# Patient Record
Sex: MALE | Race: WHITE | NOT HISPANIC OR LATINO | Employment: OTHER | URBAN - METROPOLITAN AREA
[De-identification: names, ages, dates, MRNs, and addresses within clinical notes are randomized per-mention and may not be internally consistent; named-entity substitution may affect disease eponyms.]

---

## 2017-03-01 ENCOUNTER — ALLSCRIPTS OFFICE VISIT (OUTPATIENT)
Dept: OTHER | Facility: OTHER | Age: 82
End: 2017-03-01

## 2017-03-01 DIAGNOSIS — R05.9 COUGH: ICD-10-CM

## 2017-03-01 DIAGNOSIS — R93.89 ABNORMAL FINDINGS ON DIAGNOSTIC IMAGING OF OTHER SPECIFIED BODY STRUCTURES: ICD-10-CM

## 2017-09-08 ENCOUNTER — APPOINTMENT (OUTPATIENT)
Dept: LAB | Facility: CLINIC | Age: 82
End: 2017-09-08
Payer: COMMERCIAL

## 2017-09-08 ENCOUNTER — TRANSCRIBE ORDERS (OUTPATIENT)
Dept: LAB | Facility: CLINIC | Age: 82
End: 2017-09-08

## 2017-09-08 DIAGNOSIS — R05.9 COUGH: ICD-10-CM

## 2017-09-08 DIAGNOSIS — R93.89 ABNORMAL FINDINGS ON DIAGNOSTIC IMAGING OF OTHER SPECIFIED BODY STRUCTURES: ICD-10-CM

## 2017-09-08 PROCEDURE — 86698 HISTOPLASMA ANTIBODY: CPT

## 2017-09-08 PROCEDURE — 87385 HISTOPLASMA CAPSUL AG IA: CPT

## 2017-09-08 PROCEDURE — 36415 COLL VENOUS BLD VENIPUNCTURE: CPT

## 2017-09-08 PROCEDURE — 86612 BLASTOMYCES ANTIBODY: CPT

## 2017-09-11 ENCOUNTER — ALLSCRIPTS OFFICE VISIT (OUTPATIENT)
Dept: OTHER | Facility: OTHER | Age: 82
End: 2017-09-11

## 2017-09-11 ENCOUNTER — TRANSCRIBE ORDERS (OUTPATIENT)
Dept: ADMINISTRATIVE | Facility: HOSPITAL | Age: 82
End: 2017-09-11

## 2017-09-11 DIAGNOSIS — R91.1 COIN LESION: Primary | ICD-10-CM

## 2017-09-11 LAB
B DERMAT AB SER QL ID: NEGATIVE
H CAPSUL AB TITR SER ID: NEGATIVE {TITER}
H CAPSUL AG UR IA-ACNC: 0 NG/ML (ref 0–0.49)

## 2017-10-11 ENCOUNTER — HOSPITAL ENCOUNTER (OUTPATIENT)
Dept: PULMONOLOGY | Facility: HOSPITAL | Age: 82
Discharge: HOME | End: 2017-10-11
Attending: INTERNAL MEDICINE
Payer: COMMERCIAL

## 2017-10-11 ENCOUNTER — HOSPITAL ENCOUNTER (OUTPATIENT)
Dept: CT IMAGING | Facility: HOSPITAL | Age: 82
Discharge: HOME | End: 2017-10-11
Attending: INTERNAL MEDICINE
Payer: COMMERCIAL

## 2017-10-11 ENCOUNTER — GENERIC CONVERSION - ENCOUNTER (OUTPATIENT)
Dept: OTHER | Facility: OTHER | Age: 82
End: 2017-10-11

## 2017-10-11 DIAGNOSIS — R91.8 OTHER NONSPECIFIC ABNORMAL FINDING OF LUNG FIELD: ICD-10-CM

## 2017-10-11 DIAGNOSIS — F17.290 NICOTINE DEPENDENCE, OTHER TOBACCO PRODUCT, UNCOMPLICATED: ICD-10-CM

## 2017-10-11 DIAGNOSIS — R91.1 COIN LESION: ICD-10-CM

## 2017-10-11 DIAGNOSIS — R05.9 COUGH: ICD-10-CM

## 2017-10-11 PROCEDURE — 94726 PLETHYSMOGRAPHY LUNG VOLUMES: CPT

## 2017-10-11 PROCEDURE — 94760 N-INVAS EAR/PLS OXIMETRY 1: CPT

## 2017-10-11 PROCEDURE — 94729 DIFFUSING CAPACITY: CPT

## 2017-10-11 PROCEDURE — 71250 CT THORAX DX C-: CPT

## 2017-10-11 PROCEDURE — 94060 EVALUATION OF WHEEZING: CPT

## 2017-10-11 RX ORDER — ALBUTEROL SULFATE 2.5 MG/3ML
2.5 SOLUTION RESPIRATORY (INHALATION) ONCE
Status: COMPLETED | OUTPATIENT
Start: 2017-10-11 | End: 2017-10-11

## 2017-10-11 RX ADMIN — ALBUTEROL SULFATE 2.5 MG: 2.5 SOLUTION RESPIRATORY (INHALATION) at 13:50

## 2018-01-13 VITALS
HEART RATE: 60 BPM | TEMPERATURE: 98 F | SYSTOLIC BLOOD PRESSURE: 106 MMHG | WEIGHT: 182 LBS | RESPIRATION RATE: 18 BRPM | DIASTOLIC BLOOD PRESSURE: 58 MMHG | OXYGEN SATURATION: 97 % | HEIGHT: 68 IN | BODY MASS INDEX: 27.58 KG/M2

## 2018-01-14 VITALS
SYSTOLIC BLOOD PRESSURE: 118 MMHG | HEART RATE: 68 BPM | HEIGHT: 68 IN | WEIGHT: 182 LBS | DIASTOLIC BLOOD PRESSURE: 60 MMHG | RESPIRATION RATE: 16 BRPM | TEMPERATURE: 97.7 F | OXYGEN SATURATION: 98 % | BODY MASS INDEX: 27.58 KG/M2

## 2018-01-15 NOTE — RESULT NOTES
Verified Results  * CT CHEST WO CONTRAST [id number] 12:36PM Buddy Koehler Layer     Test Name Result Flag Reference   CT CHEST WO CONTRAST (Report)     CT CHEST WITHOUT IV CONTRAST     INDICATION: Follow-up pulmonary nodule  Cough  COMPARISON: May 31, 2012     TECHNIQUE: CT examination of the chest was performed without intravenous contrast  Axial, sagittal and coronal reformatted images were submitted for interpretation  Coronal thick section MIP (maximal intensity projection) images were also created  This examination, like all CT scans performed in the [facility], was performed utilizing techniques to minimize radiation dose exposure, including the use of iterative reconstruction and automated exposure control  FINDINGS:     LUNGS: No suspicious mass lesion or infiltrates evident  Single small RIGHT upper lobe nodule measuring 3 mm, stable compared to prior, image 21  The nodules previously indicated, on the RIGHT is pleural-based and likely related to a stable plaque  On the LEFT, not well appreciated on the current exam but also felt likely related to pleural plaque  In both lung bases there is dependent central peribronchial thickening and very mild bronchiectasis and mild interstitial thickening in the dependent lower lungs  While this could represent a component of underlying fibrosis, may also be related to    chronic infections  PLEURA: Multiple bilateral calcified pleural plaques distributed along the lateral pleura as well as the posterior medial regions, fairly symmetric, presumed related to asbestos exposure  Correlate with history  Prior RIGHT effusion resolved  HEART/GREAT VESSELS: Unremarkable for patient's age  MEDIASTINUM AND KAM: There are multiple partially calcified mediastinal and LEFT hilar lymph nodes  This is seen in conjunction with calcified granulomata within the spleen   This is most commonly associated with a prior granulomatous infection such as histoplasmosis  CHEST WALL AND LOWER NECK: Unremarkable  VISUALIZED STRUCTURES IN THE UPPER ABDOMEN: Unremarkable  OSSEOUS STRUCTURES: No acute fracture  No destructive osseous lesion  IMPRESSION:     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

## 2018-04-10 RX ORDER — ASPIRIN 81 MG
1 TABLET, DELAYED RELEASE (ENTERIC COATED) ORAL DAILY
COMMUNITY
Start: 2016-10-06

## 2018-04-10 RX ORDER — FLUTICASONE PROPIONATE 50 MCG
1 SPRAY, SUSPENSION (ML) NASAL 2 TIMES DAILY
COMMUNITY
Start: 2016-10-06 | End: 2020-08-12 | Stop reason: ALTCHOICE

## 2018-04-10 RX ORDER — HYDROXYZINE HYDROCHLORIDE 25 MG/1
TABLET, FILM COATED ORAL
Refills: 1 | COMMUNITY
Start: 2018-02-05 | End: 2020-08-12 | Stop reason: ALTCHOICE

## 2018-04-10 RX ORDER — FLUTICASONE PROPIONATE 50 MCG
1 SPRAY, SUSPENSION (ML) NASAL 2 TIMES DAILY
COMMUNITY
Start: 2017-03-01 | End: 2018-04-10 | Stop reason: SDUPTHER

## 2018-04-10 RX ORDER — TRIAMCINOLONE ACETONIDE 1 MG/G
CREAM TOPICAL
Refills: 1 | COMMUNITY
Start: 2018-03-29

## 2018-04-10 RX ORDER — FLUTICASONE FUROATE 100 UG/1
POWDER RESPIRATORY (INHALATION)
Refills: 5 | COMMUNITY
Start: 2018-01-25 | End: 2018-04-10 | Stop reason: SDUPTHER

## 2018-04-10 RX ORDER — CLOBETASOL PROPIONATE 0.5 MG/G
CREAM TOPICAL 2 TIMES DAILY
COMMUNITY
Start: 2016-10-06

## 2018-04-10 RX ORDER — FOLIC ACID 1 MG/1
1 TABLET ORAL DAILY
COMMUNITY
Start: 2016-10-06

## 2018-04-10 RX ORDER — BIOTIN 1 MG
1 TABLET ORAL DAILY
COMMUNITY
Start: 2016-10-06

## 2018-04-10 RX ORDER — MYCOPHENOLATE MOFETIL 500 MG/1
TABLET ORAL
Refills: 1 | COMMUNITY
Start: 2018-02-02

## 2018-04-11 ENCOUNTER — OFFICE VISIT (OUTPATIENT)
Dept: PULMONOLOGY | Facility: CLINIC | Age: 83
End: 2018-04-11
Payer: COMMERCIAL

## 2018-04-11 VITALS
DIASTOLIC BLOOD PRESSURE: 62 MMHG | SYSTOLIC BLOOD PRESSURE: 118 MMHG | TEMPERATURE: 98.5 F | RESPIRATION RATE: 17 BRPM | HEART RATE: 70 BPM | BODY MASS INDEX: 28.02 KG/M2 | OXYGEN SATURATION: 97 % | WEIGHT: 184.9 LBS | HEIGHT: 68 IN

## 2018-04-11 DIAGNOSIS — R05.3 CHRONIC COUGH: ICD-10-CM

## 2018-04-11 DIAGNOSIS — R91.8 LUNG NODULES: ICD-10-CM

## 2018-04-11 DIAGNOSIS — R93.89 ABNORMAL CHEST CT: ICD-10-CM

## 2018-04-11 DIAGNOSIS — J92.0 ASBESTOS-INDUCED PLEURAL PLAQUE: Primary | ICD-10-CM

## 2018-04-11 PROCEDURE — 99213 OFFICE O/P EST LOW 20 MIN: CPT | Performed by: INTERNAL MEDICINE

## 2018-04-11 NOTE — ASSESSMENT & PLAN NOTE
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, doubt postnasal drips since Flonase did not help at all, will consider possible cough variant asthma and so I gave patient free samples of Asmanex 200 mcg to use 1 puff b i d  and he will report to me in 4-6 weeks if he feels better I will order an inhaled corticosteroids  Otherwise if he continues to have cough then I may consider PPI or H2 blocker and also possible speech evaluation  I told him to watch his symptoms when he is eating if his cough is triggered by food/swallowing then he will let his primary care physician know to order speech eval   I had long discussion with patient and reviewed CT scan with him, I explained to him that his cough may not be curable and if it is secondary to chronic infection with MAC the treatment will be very prolonged with possible side effects and for that I have to prove with most likely bronchoscopy  I do not believe his cough is so bad to the point that he will need bronchoscopy and treatment of possible MAC also his CT scan does not reflect any significant changes to require prolonged antibiotics  He verbalized understanding, he will try the Asmanex, if it does not work I offered him to be seen by one of my colleagues to get a 2nd opinion

## 2018-04-11 NOTE — PROGRESS NOTES
Progress note - Pulmonary Medicine   Kami Hameed [de-identified] y o  male MRN: [id number]       Impression & Plan:     Lung nodules  Stable on imaging study, low risk for lung cancer, no further follow-up    Abnormal chest CT  Multiple changes with lung nodules, calcified and also calcified lymph nodes, tree-in-bud changes suggestive of chronic infection most likely with MAC versus fungal such as histoplasmosis although serologies negative  Asbestos-induced pleural plaque  Incidentally finding on CT scan, definitely not responsible for cough or shortness of breath  Chronic cough  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, doubt postnasal drips since Flonase did not help at all, will consider possible cough variant asthma and so I gave patient free samples of Asmanex 200 mcg to use 1 puff b i d  and he will report to me in 4-6 weeks if he feels better I will order an inhaled corticosteroids  Otherwise if he continues to have cough then I may consider PPI or H2 blocker and also possible speech evaluation  I told him to watch his symptoms when he is eating if his cough is triggered by food/swallowing then he will let his primary care physician know to order speech eval   I had long discussion with patient and reviewed CT scan with him, I explained to him that his cough may not be curable and if it is secondary to chronic infection with MAC the treatment will be very prolonged with possible side effects and for that I have to prove with most likely bronchoscopy  I do not believe his cough is so bad to the point that he will need bronchoscopy and treatment of possible MAC also his CT scan does not reflect any significant changes to require prolonged antibiotics  He verbalized understanding, he will try the Asmanex, if it does not work I offered him to be seen by one of my colleagues to get a 2nd opinion  ______________________________________________________________________    HPI:    Darya Rios presents today for follow-up of chronic cough has been going for few years, mainly during the day but not at night, he sleeps well at night without any issues, he has mild dyspnea on exertion that he attributes to his age, denies any wheezing, denies any chest pain or fever or chills or hemoptysis, denies any weight loss  His cough is usually either productive of clear sputum or dry and sporadic during the day  No change in quality of cough over the past few years  He tried Flonase recently but no improvement and cough  He denies any acid reflux or heartburn  He denies any difficulty swallowing or dysphagia or coughing while eating, but certain food specially dry/heart food such as peanuts and hart triggers his cough  Patient lives alone  Review of Systems:  Review of Systems   Constitutional: Negative  HENT: Negative  Eyes: Negative  Respiratory:        As HPI   Cardiovascular: Negative  Gastrointestinal: Negative  Endocrine: Negative  Genitourinary: Negative  Musculoskeletal: Negative  Skin: Negative  Allergic/Immunologic: Negative  Neurological: Negative  Hematological: Negative  Psychiatric/Behavioral: Negative  Past medical history, surgical history, and family history were reviewed and updated as appropriate    Social history updates:  History   Smoking Status    Not on file   Smokeless Tobacco    Not on file       PhysicalExamination:  Vitals: There were no vitals taken for this visit      General: alert, not in acute distress  HEENT: PERRL, no icteric sclera or cyanosis, no thrush  Neck:  Supple, no lymphadenopathy or thyromegaly, no JVD  Lungs:  Equal breath sounds and clear auscultations bilaterally, no wheezing or crackles  Heart: S1S2 regular, no murmures or gallops  Abdomen: soft, non-tender, bowel sounds  present  Extrimities: no edema, no clubbing or cyanosis  Neuro: Alert and oriented x 3, no focal neurodeficits   Skin: intact, no rashes    Diagnostic Data:  Labs: I personally reviewed the most recent laboratory data pertinent to today's visit    Lab Results   Component Value Date    WBC 4 77 08/20/2015    HGB 13 4 08/20/2015    HCT 40 3 08/20/2015    MCV 90 08/20/2015     08/20/2015     Lab Results   Component Value Date    GLUCOSE 78 08/20/2015    CALCIUM 9 1 08/20/2015     08/20/2015    K 3 6 08/20/2015    CO2 28 08/20/2015     08/20/2015    BUN 6 08/20/2015    CREATININE 0 80 08/20/2015     No results found for: IGE  Lab Results   Component Value Date    ALT 15 08/19/2015    AST 18 08/19/2015    ALKPHOS 56 08/19/2015    BILITOT 0 78 08/19/2015       PFT results: The most recent pulmonary function tests were reviewed  Normal spirometry and normal vital capacity, no bronchodilator response, normal lung volumes with probably mild air trapping, mildly reduced diffusion capacity    Imaging:  I personally reviewed the images on the [facility] system pertinent to today's visit  Chest CT scan reviewed on PACs: IMPRESSION:   No significant interval change in the appearance of the pulmonary nodules, which appear stable dating back to 2012  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 as previously discussed on prior CT imaging  Several of the previously identified pulmonary nodules appear to be associated with these pleural plaques  Unchanged tree in bud appearance of the left anterior upper lobe, nonspecific in nature but potentially related to chronic infection  Interstitial thickening and bronchiectasis concerning for possible fibrosis versus chronic infection  Calcified stable mediastinal hilar adenopathy as well as 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

## 2018-04-11 NOTE — ASSESSMENT & PLAN NOTE
Multiple changes with lung nodules, calcified and also calcified lymph nodes, tree-in-bud changes suggestive of chronic infection most likely with MAC versus fungal such as histoplasmosis although serologies negative

## 2018-05-31 ENCOUNTER — TELEPHONE (OUTPATIENT)
Dept: PULMONOLOGY | Facility: CLINIC | Age: 83
End: 2018-05-31

## 2018-05-31 NOTE — TELEPHONE ENCOUNTER
Pt states asmanax sample really helped him a lot and now he is requesting refills  # 90 days supply with refills

## 2018-06-01 ENCOUNTER — HOSPITAL ENCOUNTER (EMERGENCY)
Facility: HOSPITAL | Age: 83
Discharge: HOME | End: 2018-06-01
Attending: EMERGENCY MEDICINE | Admitting: EMERGENCY MEDICINE
Payer: COMMERCIAL

## 2018-06-01 ENCOUNTER — APPOINTMENT (EMERGENCY)
Dept: RADIOLOGY | Facility: HOSPITAL | Age: 83
End: 2018-06-01
Payer: COMMERCIAL

## 2018-06-01 VITALS
WEIGHT: 178 LBS | SYSTOLIC BLOOD PRESSURE: 121 MMHG | BODY MASS INDEX: 28.61 KG/M2 | TEMPERATURE: 98.1 F | OXYGEN SATURATION: 97 % | DIASTOLIC BLOOD PRESSURE: 64 MMHG | RESPIRATION RATE: 17 BRPM | HEART RATE: 89 BPM | HEIGHT: 66 IN

## 2018-06-01 DIAGNOSIS — J45.909 SEVERE ASTHMA, UNSPECIFIED WHETHER COMPLICATED, UNSPECIFIED WHETHER PERSISTENT: Primary | ICD-10-CM

## 2018-06-01 DIAGNOSIS — M54.9 MUSCULOSKELETAL BACK PAIN: Primary | ICD-10-CM

## 2018-06-01 LAB
ANION GAP SERPL CALCULATED.3IONS-SCNC: 5 MMOL/L (ref 4–13)
BASOPHILS # BLD AUTO: 0.05 THOUSANDS/ΜL (ref 0–0.1)
BASOPHILS NFR BLD AUTO: 1 % (ref 0–1)
BILIRUB UR QL STRIP: NEGATIVE
BUN SERPL-MCNC: 14 MG/DL (ref 5–25)
CALCIUM SERPL-MCNC: 9 MG/DL (ref 8.3–10.1)
CHLORIDE SERPL-SCNC: 98 MMOL/L (ref 100–108)
CLARITY UR: CLEAR
CO2 SERPL-SCNC: 30 MMOL/L (ref 21–32)
COLOR UR: YELLOW
CREAT SERPL-MCNC: 0.99 MG/DL (ref 0.6–1.3)
EOSINOPHIL # BLD AUTO: 0.21 THOUSAND/ΜL (ref 0–0.61)
EOSINOPHIL NFR BLD AUTO: 5 % (ref 0–6)
ERYTHROCYTE [DISTWIDTH] IN BLOOD BY AUTOMATED COUNT: 12.9 % (ref 11.6–15.1)
GFR SERPL CREATININE-BSD FRML MDRD: 72 ML/MIN/1.73SQ M
GLUCOSE SERPL-MCNC: 81 MG/DL (ref 65–140)
GLUCOSE UR STRIP-MCNC: NEGATIVE MG/DL
HCT VFR BLD AUTO: 47.7 % (ref 36.5–49.3)
HGB BLD-MCNC: 15.9 G/DL (ref 12–17)
HGB UR QL STRIP.AUTO: NEGATIVE
IMM GRANULOCYTES # BLD AUTO: 0.01 THOUSAND/UL (ref 0–0.2)
IMM GRANULOCYTES NFR BLD AUTO: 0 % (ref 0–2)
KETONES UR STRIP-MCNC: ABNORMAL MG/DL
LEUKOCYTE ESTERASE UR QL STRIP: NEGATIVE
LYMPHOCYTES # BLD AUTO: 0.6 THOUSANDS/ΜL (ref 0.6–4.47)
LYMPHOCYTES NFR BLD AUTO: 13 % (ref 14–44)
MCH RBC QN AUTO: 30.5 PG (ref 26.8–34.3)
MCHC RBC AUTO-ENTMCNC: 33.3 G/DL (ref 31.4–37.4)
MCV RBC AUTO: 92 FL (ref 82–98)
MONOCYTES # BLD AUTO: 0.56 THOUSAND/ΜL (ref 0.17–1.22)
MONOCYTES NFR BLD AUTO: 12 % (ref 4–12)
NEUTROPHILS # BLD AUTO: 3.1 THOUSANDS/ΜL (ref 1.85–7.62)
NEUTS SEG NFR BLD AUTO: 69 % (ref 43–75)
NITRITE UR QL STRIP: NEGATIVE
NRBC BLD AUTO-RTO: 0 /100 WBCS
PH UR STRIP.AUTO: 6 [PH] (ref 4.5–8)
PLATELET # BLD AUTO: 199 THOUSANDS/UL (ref 149–390)
PMV BLD AUTO: 10.1 FL (ref 8.9–12.7)
POTASSIUM SERPL-SCNC: 4.1 MMOL/L (ref 3.5–5.3)
PROT UR STRIP-MCNC: NEGATIVE MG/DL
RBC # BLD AUTO: 5.21 MILLION/UL (ref 3.88–5.62)
SODIUM SERPL-SCNC: 133 MMOL/L (ref 136–145)
SP GR UR STRIP.AUTO: 1.02 (ref 1–1.03)
UROBILINOGEN UR QL STRIP.AUTO: 0.2 E.U./DL
WBC # BLD AUTO: 4.53 THOUSAND/UL (ref 4.31–10.16)

## 2018-06-01 PROCEDURE — 85025 COMPLETE CBC W/AUTO DIFF WBC: CPT | Performed by: EMERGENCY MEDICINE

## 2018-06-01 PROCEDURE — 74177 CT ABD & PELVIS W/CONTRAST: CPT

## 2018-06-01 PROCEDURE — 96372 THER/PROPH/DIAG INJ SC/IM: CPT

## 2018-06-01 PROCEDURE — 81003 URINALYSIS AUTO W/O SCOPE: CPT | Performed by: EMERGENCY MEDICINE

## 2018-06-01 PROCEDURE — 80048 BASIC METABOLIC PNL TOTAL CA: CPT | Performed by: EMERGENCY MEDICINE

## 2018-06-01 PROCEDURE — 99284 EMERGENCY DEPT VISIT MOD MDM: CPT

## 2018-06-01 PROCEDURE — 36415 COLL VENOUS BLD VENIPUNCTURE: CPT | Performed by: EMERGENCY MEDICINE

## 2018-06-01 RX ORDER — NAPROXEN 500 MG/1
500 TABLET ORAL 2 TIMES DAILY WITH MEALS
Qty: 30 TABLET | Refills: 0 | Status: SHIPPED | OUTPATIENT
Start: 2018-06-01 | End: 2020-08-12 | Stop reason: ALTCHOICE

## 2018-06-01 RX ORDER — KETOROLAC TROMETHAMINE 30 MG/ML
15 INJECTION, SOLUTION INTRAMUSCULAR; INTRAVENOUS ONCE
Status: COMPLETED | OUTPATIENT
Start: 2018-06-01 | End: 2018-06-01

## 2018-06-01 RX ORDER — ACETAMINOPHEN 325 MG/1
650 TABLET ORAL ONCE
Status: COMPLETED | OUTPATIENT
Start: 2018-06-01 | End: 2018-06-01

## 2018-06-01 RX ADMIN — ACETAMINOPHEN 650 MG: 325 TABLET ORAL at 15:19

## 2018-06-01 RX ADMIN — IOHEXOL 100 ML: 350 INJECTION, SOLUTION INTRAVENOUS at 16:48

## 2018-06-01 RX ADMIN — KETOROLAC TROMETHAMINE 15 MG: 30 INJECTION, SOLUTION INTRAMUSCULAR at 15:19

## 2018-06-01 NOTE — DISCHARGE INSTRUCTIONS
Acute Low Back Pain   WHAT YOU NEED TO KNOW:   Acute low back pain is sudden discomfort in your lower back area that lasts for up to 6 weeks  The discomfort makes it difficult to tolerate activity  DISCHARGE INSTRUCTIONS:   Return to the emergency department if:   · You have severe pain  · You have sudden stiffness and heaviness on both buttocks down to both legs  · You have numbness or weakness in one leg, or pain in both legs  · You have numbness in your genital area or across your lower back  · You cannot control your urine or bowel movements  Contact your healthcare provider if:   · You have a fever  · You have pain at night or when you rest     · Your pain does not get better with treatment  · You have pain that worsens when you cough or sneeze  · You suddenly feel something pop or snap in your back  · You have questions or concerns about your condition or care  Medicines: The following medicines may be ordered by your healthcare provider:  · Acetaminophen  decreases pain  It is available without a doctor's order  Ask how much to take and how often to take it  Follow directions  Acetaminophen can cause liver damage if not taken correctly  · NSAIDs  help decrease swelling and pain  This medicine is available with or without a doctor's order  NSAIDs can cause stomach bleeding or kidney problems in certain people  If you take blood thinner medicine, always ask your healthcare provider if NSAIDs are safe for you  Always read the medicine label and follow directions  · Prescription pain medicine  may be given  Ask your healthcare provider how to take this medicine safely  · Muscle relaxers  decrease pain by relaxing the muscles in your lower spine  · Take your medicine as directed  Contact your healthcare provider if you think your medicine is not helping or if you have side effects  Tell him of her if you are allergic to any medicine   Keep a list of the medicines, vitamins, and herbs you take  Include the amounts, and when and why you take them  Bring the list or the pill bottles to follow-up visits  Carry your medicine list with you in case of an emergency  Self-care:   · Stay active  as much as you can without causing more pain  Bed rest could make your back pain worse  Start with some light exercises such as walking  Avoid heavy lifting until your pain is gone  Ask for more information about the activities or exercises that are right for you  · Ice  helps decrease swelling, pain, and muscle spams  Put crushed ice in a plastic bag  Cover it with a towel  Place it on your lower back for 20 to 30 minutes every 2 hours  Do this for about 2 to 3 days after your pain starts, or as directed  · Heat  helps decrease pain and muscle spasms  Start to use heat after treatment with ice has stopped  Use a small towel dampened with warm water or a heating pad, or sit in a warm bath  Apply heat on the area for 20 to 30 minutes every 2 hours for as many days as directed  Alternate heat and ice  Prevent acute low back pain:   · Use proper body mechanics  ¨ Bend at the hips and knees when you  objects  Do not bend from the waist  Use your leg muscles as you lift the load  Do not use your back  Keep the object close to your chest as you lift it  Try not to twist or lift anything above your waist     ¨ Change your position often when you stand for long periods of time  Rest one foot on a small box or footrest, and then switch to the other foot often  ¨ Try not to sit for long periods of time  When you do, sit in a straight-backed chair with your feet flat on the floor  Never reach, pull, or push while you are sitting  · Do exercises that strengthen your back muscles  Warm up before you exercise  Ask your healthcare provider the best exercises for you  · Maintain a healthy weight  Ask your healthcare provider how much you should weigh   Ask him to help you create a weight loss plan if you are overweight  Follow up with your healthcare provider as directed:  Return for a follow-up visit if you still have pain after 1 to 3 weeks of treatment  You may need to visit an orthopedist if your back pain lasts more than 12 weeks  Write down your questions so you remember to ask them during your visits  © 2017 [employer] [address]  Information is for End User's use only and may not be sold, redistributed or otherwise used for commercial purposes  All illustrations and images included in CareNotes® are the copyrighted property of [employer]  or [employer]  The above information is an  only  It is not intended as medical advice for individual conditions or treatments  Talk to your doctor, nurse or pharmacist before following any medical regimen to see if it is safe and effective for you

## 2018-06-01 NOTE — TELEPHONE ENCOUNTER
----- Message from [facility] sent at 5/31/2018  4:05 PM EDT -----  Need medication dosage
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
## 2018-09-22 DIAGNOSIS — J45.909 SEVERE ASTHMA, UNSPECIFIED WHETHER COMPLICATED, UNSPECIFIED WHETHER PERSISTENT: ICD-10-CM

## 2018-09-22 RX ORDER — MOMETASONE FUROATE 220 UG/1
2 INHALANT RESPIRATORY (INHALATION) DAILY
Qty: 1 INHALER | Refills: 3 | Status: SHIPPED | OUTPATIENT
Start: 2018-09-22 | End: 2018-11-19 | Stop reason: SDUPTHER

## 2018-11-19 DIAGNOSIS — J45.909 SEVERE ASTHMA, UNSPECIFIED WHETHER COMPLICATED, UNSPECIFIED WHETHER PERSISTENT: ICD-10-CM

## 2020-07-31 ENCOUNTER — TELEPHONE (OUTPATIENT)
Dept: OTHER | Facility: OTHER | Age: 85
End: 2020-07-31

## 2020-08-03 ENCOUNTER — NURSING HOME VISIT (OUTPATIENT)
Dept: GERIATRICS | Facility: OTHER | Age: 85
End: 2020-08-03
Payer: COMMERCIAL

## 2020-08-03 DIAGNOSIS — S32.10XD CLOSED FRACTURE OF SACRUM WITH ROUTINE HEALING, UNSPECIFIED PORTION OF SACRUM, SUBSEQUENT ENCOUNTER: Primary | ICD-10-CM

## 2020-08-03 DIAGNOSIS — L10.9 PEMPHIGUS: ICD-10-CM

## 2020-08-03 DIAGNOSIS — I10 ESSENTIAL HYPERTENSION: ICD-10-CM

## 2020-08-03 DIAGNOSIS — R29.6 RECURRENT FALLS: ICD-10-CM

## 2020-08-03 DIAGNOSIS — D64.9 ANEMIA, UNSPECIFIED TYPE: ICD-10-CM

## 2020-08-03 DIAGNOSIS — E87.1 HYPONATREMIA: ICD-10-CM

## 2020-08-03 DIAGNOSIS — M54.50 ACUTE LOW BACK PAIN WITHOUT SCIATICA, UNSPECIFIED BACK PAIN LATERALITY: ICD-10-CM

## 2020-08-03 PROBLEM — Z96.649 S/P TOTAL HIP ARTHROPLASTY: Status: ACTIVE | Noted: 2020-05-30

## 2020-08-03 PROBLEM — R41.3 POOR SHORT TERM MEMORY: Status: ACTIVE | Noted: 2020-05-04

## 2020-08-03 PROBLEM — R26.2 AMBULATORY DYSFUNCTION: Status: ACTIVE | Noted: 2020-06-02

## 2020-08-03 PROBLEM — J47.9 BRONCHIECTASIS WITHOUT COMPLICATION (HCC): Status: ACTIVE | Noted: 2020-05-04

## 2020-08-03 PROBLEM — F10.20 ALCOHOLISM (HCC): Status: ACTIVE | Noted: 2020-08-03

## 2020-08-03 PROBLEM — M54.59 INTRACTABLE LOW BACK PAIN: Status: ACTIVE | Noted: 2020-07-23

## 2020-08-03 LAB — EXT SARS-COV-2: NOT DETECTED

## 2020-08-03 PROCEDURE — 99305 1ST NF CARE MODERATE MDM 35: CPT | Performed by: FAMILY MEDICINE

## 2020-08-03 NOTE — PROGRESS NOTES
[facility]   [address] [place] [address] Comp, [address]  History and Physical  POS: SNF-31    Records Reviewed include: [facility] records  Unable to obtain from patient due to: History obtained from patient along with hospital record review and nursing note review    Chief Complaint/ Reason for Admission: Sacral fracture, recurrent falls    History of Present Illness:            82year old male admitted for SNF rehab following hospitalization at [facility], [facility]  Patient with progressive lower back pain since fall approximately 3 weeks prior to admission  MRI lumbar spine revealing S2 fracture  Patient continues with low back pain; constant; describes as aching; exacerbated with movement; relieved partially with rest along with PRN OxyIR- last dose early morning on 8/2  Denies change in bowel or bladder habits (incontinence, constipation or urinary retention)  Is on senna-docusate for constipation  Denies pain or weakness in legs, denies paresthesias  Overall difficulty with gait and weakness in setting of multiple recent hospitalizations  See med list below for current pain regimen  Noted to have hyponatremia with low serum osm; baseline sodium 132-134 since June 2020; was WNL prior  Also noted to have mild anemia; no abnormal bruising noted  Is on cellcept for pemphigus  Continues on toprol XL for hypertension; SBP trending 100s-120s since SNF admission, HR 60s-70s  Patient elects CPR         Allergies    Allergies   Allergen Reactions    Penicillin V Anaphylaxis    Sulfur Anaphylaxis       Past Medical History  Past Medical History:   Diagnosis Date    Hypertension       CHF baseline EF:60% (8/2015)  CKD: Stage 2, 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    Past Surgical History:   Procedure Laterality Date    JOINT REPLACEMENT      LUNG SURGERY         Family History  Family History   Problem Relation Age of Onset    Cancer Father     Tremor Father     Tremor Brother     Cancer Mother     Stroke Mother        Social History  Social History     Tobacco Use   Smoking Status Former Smoker    Types: Cigars   Smokeless Tobacco Never Used      Social History     Substance and Sexual Activity   Alcohol Use No      Social History     Substance and Sexual Activity   Drug Use No        Lives: Apartment,  Social Support: Family, caretaker  Education Level:  Occupation:Retired  Fall in the past 12 months: Yes  Use of assistance Device: Rolling Walker    Physical Exam    Weight: 155lb Temp:97 5F BP:108/66 (107//68) Pulse:69 Resp:18 O2 Sat:96% RA  Constitutional: Normocephalic  Orientation:Person, Place and Day, situation     Physical Exam   Constitutional: He does not appear ill  No distress  HENT:   Head: Normocephalic and atraumatic  Mouth/Throat: Mucous membranes are moist    Eyes: Conjunctivae are normal  Right eye exhibits no discharge  Left eye exhibits no discharge  No scleral icterus  Neck: Neck supple  No neck rigidity  Pulmonary/Chest: Effort normal  No stridor  No respiratory distress  Abdominal: Soft  He exhibits no distension  There is no abdominal tenderness  Musculoskeletal:      Right lower leg: No edema  Left lower leg: No edema  Neurological: He is alert  No cranial nerve deficit  Moves all 4 extremities  Pupils constricted (room in bright light), reactive   Skin: Skin is warm and dry  He is not diaphoretic  Left cheek infraorbital irregular raised lesion; no surrounding erythema or drainage   Psychiatric: Mood normal    Nursing note and vitals reviewed  Review of Systems:  Review of Systems   Constitutional: Negative for appetite change, fatigue and fever  Respiratory: Negative for cough and shortness of breath  Cardiovascular: Negative for leg swelling  Gastrointestinal: Negative for abdominal pain and constipation  Genitourinary: Negative for decreased urine volume and difficulty urinating  Musculoskeletal: Positive for back pain and gait problem  Skin: Negative for rash  Neurological: Negative for numbness  Psychiatric/Behavioral: Negative for sleep disturbance  All other systems reviewed and are negative  List of Current Medications:  Tylenol 500mg, 2 tabs Q8h  Aspirin 81mg BID  LidoPatch 4%-1% daily  CellCept 500mg BID  Senna-docusate 8 6mg-50mg BID  Vitamin D3 1000 IU daily  Toprol XL 12 5mg daily  Omeprazole 40mg daily    PRN: bisacodyl, hydroxyzine, OxyIR, triamcinolone, MoM, fleet enema, maalox      Allergies    Allergies   Allergen Reactions    Penicillin V Anaphylaxis    Sulfur Anaphylaxis       Labs/Diagnostics (reviewed by this provider): Hospital Paperwork and Old Records  TSH:2 00  CBC: Hb:11 5 (13 0) Hct:34 0 WBC:6 7 PLT:253  CMP: Na:131 (128) K:4 2 Cl:99 (96) CO:27 BUN:12 Cr:0 89 Glu:109 Ca:9 0   AST:15 ALT:11 Alk-P:71 Tprotein:6 4 Albumin:2 9   Tbili:0 6   Other: V95:535 Folic KCTV:77 8  NABEEL-24 (7/27, 7/30): negative  Urine Na: 80  Urine K: 21 9  Urine osm: 295  Serum osm: 272    Imaging Reviewed: Other: MRI L spine (7/24/20): S2 acute or subacute fracture; spondylosis with marked facet atrophy; mild central stenosis at L3-L4; foraminal stenosis b/l L2-L3 and L5-S1    Assessment/Plan:  82year old male with:    Closed fracture of sacrum with routine healing  Traumatic, in setting of recent falls  Pain control as outlined  Routine orthopedic follow up as scheduled     Acute low back pain without sciatica  Continue scheduled acetaminophen, topical lidocaine patch, PRN OxyIR    Hyponatremia  True hyponatremia with low serum osm  TSH WNL  Urine Na >40  Results consistent with SIADH- ?  Exacerbated by pain with recurrent falls  BMP ordered for 8/4; implement fluid restriction of 1 5L/day if Na remains <131    Anemia  CBC ordered for 8/4    Hypertension  Continue Toprol-XL  Avoid hypotension  Goal <140-150/90    Pemphigus  Continue cellcept   PRN hydroxyzine and triamcinolone     Recurrent falls  Multifactorial  Admit to SNF for rehab  PT/OT consult- evaluate and treat  Supportive care, nutritional support, ADL support  Fall precautions  Continue Vitamin D supplementation      Pain: Present yes- low back, see HPI  Rehab Potential:Fair  Patient Informed of Medical Condition: yes  Patient is Capable of Understanding Their Right: yes  Prognosis:Fair  Discharge Plan: STR-> Home per patient  Surrogate Decision Maker: José Manuel  Advanced Directives: N/A  Code status:Full Code  PCP: Christine Lynch DO    Immunization History   Administered Date(s) Administered    INFLUENZA 12/12/2008, 11/15/2010, 11/11/2011, 10/26/2012, 09/16/2013, 09/19/2014, 10/14/2015, 10/06/2016, 10/23/2017    Influenza Quadrivalent Preservative Free ID 10/06/2016    Influenza Split High Dose Preservative Free IM 10/25/2018, 10/31/2019    Pneumococcal Conjugate 13-Valent 10/14/2015    Pneumococcal Polysaccharide PPV23 09/23/2005    Td (adult), Unspecified 09/23/2005    Zoster 04/16/2016, 04/16/2016       Amara Adler DO  8/3/20

## 2020-08-04 NOTE — ASSESSMENT & PLAN NOTE
Traumatic, in setting of recent falls  Pain control as outlined  Routine orthopedic follow up as scheduled

## 2020-08-04 NOTE — ASSESSMENT & PLAN NOTE
Multifactorial  Admit to SNF for rehab  PT/OT consult- evaluate and treat  Supportive care, nutritional support, ADL support  Fall precautions  Continue Vitamin D supplementation

## 2020-08-04 NOTE — ASSESSMENT & PLAN NOTE
True hyponatremia with low serum osm  TSH WNL  Urine Na >40  Results consistent with SIADH- ?  Exacerbated by pain with recurrent falls  BMP ordered for 8/4; implement fluid restriction of 1 5L/day if Na remains <131

## 2020-08-05 ENCOUNTER — NURSING HOME VISIT (OUTPATIENT)
Dept: GERIATRICS | Facility: OTHER | Age: 85
End: 2020-08-05
Payer: COMMERCIAL

## 2020-08-05 DIAGNOSIS — L10.9 PEMPHIGUS: ICD-10-CM

## 2020-08-05 DIAGNOSIS — E87.1 HYPONATREMIA: ICD-10-CM

## 2020-08-05 DIAGNOSIS — D64.9 ANEMIA, UNSPECIFIED TYPE: ICD-10-CM

## 2020-08-05 DIAGNOSIS — R29.6 RECURRENT FALLS: ICD-10-CM

## 2020-08-05 DIAGNOSIS — M54.50 ACUTE LOW BACK PAIN WITHOUT SCIATICA, UNSPECIFIED BACK PAIN LATERALITY: ICD-10-CM

## 2020-08-05 DIAGNOSIS — I10 ESSENTIAL HYPERTENSION: ICD-10-CM

## 2020-08-05 DIAGNOSIS — S32.10XD CLOSED FRACTURE OF SACRUM WITH ROUTINE HEALING, UNSPECIFIED PORTION OF SACRUM, SUBSEQUENT ENCOUNTER: Primary | ICD-10-CM

## 2020-08-05 PROCEDURE — 99309 SBSQ NF CARE MODERATE MDM 30: CPT | Performed by: NURSE PRACTITIONER

## 2020-08-05 NOTE — ASSESSMENT & PLAN NOTE
-SBPs trending 100s-120s  -patient is on metoprolol succinate ER 12 5 mg daily  -continue to monitor

## 2020-08-05 NOTE — ASSESSMENT & PLAN NOTE
-continues to complain of low back pain exacerbated with movement  -continue scheduled acetaminophen  -discontinue lidocaine and order salonpas pain patch  -continue p r n   Oxycodone IR

## 2020-08-05 NOTE — ASSESSMENT & PLAN NOTE
-multifactorial  -continue care and support at [facility] for ADLs  -continue PT/OT  -continue fall precautions  -continue vitamin-D supplementation  -provide nutritional support

## 2020-08-05 NOTE — ASSESSMENT & PLAN NOTE
-sodium trend trend 132 on a 04/2020<131<128    Fluid restriction not necessary this time  -recheck BMP in 1 week  -continue to monitor

## 2020-08-05 NOTE — PROGRESS NOTES
DAY OF SURGERY INSTRUCTIONS        YOUR SURGEON: dr reyna    PROCEDURE: ***cystoscopy , transurethral resection of bladder tumor    DATE OF SURGERY: ***11/15/2019    ARRIVAL TIME: AS DIRECTED BY OFFICE    YOU MAY TAKE THE FOLLOWING MEDICATION(S) THE MORNING OF SURGERY WITH A SIP OF WATER: ***xanax per direction of anesthesia    ALL OTHER HOME MEDICATIONS CHECK WITH YOUR DOCTOR              MANAGING PAIN AFTER SURGERY    We know you are probably wondering what your pain will be like after surgery.  Following surgery it is unrealistic to expect you will not have pain.   Pain is how our bodies let us know that something is wrong or cautions us to be careful.  That said, our goal is to make your pain tolerable.    Methods we may use to treat your pain include (oral or IV medications, PCAs, epidurals, nerve blocks, etc.)   While some procedures require IV pain medications for a short time after surgery, transitioning to pain medications by mouth allows for better management of pain.   Your nurse will encourage you to take oral pain medications whenever possible.  IV medications work almost immediately, but only last a short while.  Taking medications by mouth allows for a more constant level of medication in your blood stream for a longer period of time.      Once your pain is out of control it is harder to get back under control.  It is important you are aware when your next dose of pain medication is due.  If you are admitted, your nurse may write the time of your next dose on the white board in your room to help you remember.      We are interested in your pain and encourage you to inform us about aggravating factors during your visit.   Many times a simple repositioning every few hours can make a big difference.    If your physician says it is okay, do not let your pain prevent you from getting out of bed. Be sure to call your nurse for assistance prior to getting up so you do not fall.      Before surgery, please  [facility]   [address] [place] [address], [place], [address]  POS: 31 (STR)  STR Note    History obtained from patient, nursing staff, and EMR  Chief Complaint/Reason for visit: STR follow up of closed fracture of sacrum with routine healing; acute low back pain without sciatica; hyponatremia; anemia; hypertension; pemphigus; recurrent falls  History of Present Illness: 22-year-old male seen and examined for STR follow up  Found patient resting in bed and appeared in no distress  C/o low back pain and has p r n  Oxy-IR, pain patch, and scheduled Tylenol ordered  He states that his appetite is good and he is sleeping but not like he does if he were home  Denies shortness of breath, chest pain, headache, dizziness, lightheadedness, abdominal pain, nausea, vomiting, diarrhea, or constipation  No recent falls at facility  No acute concerns reported by nursing  Past Medical History: unchanged from history and physical  Past Medical History:   Diagnosis Date    Hypertension      Family History: unchanged from history and physical  Social History: unchanged from history and physical  Resident Since:  07/31/2020  Review of systems: Review of Systems   Constitutional: Negative for appetite change, chills and diaphoresis  HENT: Negative  Eyes: Negative  Respiratory: Negative for cough and shortness of breath  Gastrointestinal: Negative for abdominal pain  Genitourinary: Negative  Musculoskeletal: Positive for arthralgias  Skin: Positive for color change  "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"  Neurological: Negative for dizziness, syncope, speech difficulty and headaches  Psychiatric/Behavioral: Negative for agitation, behavioral problems, dysphoric mood and hallucinations  The patient is not nervous/anxious  All other systems reviewed and are negative  Medications:   All medication orders were reviewed at facility EMR  Allergies: Reviewed and unchanged  Consults reviewed:PT, OT and Other  Labs/Diagnostics (reviewed by this provider): Copy in Chart    Imaging Reviewed:  None today    Physical Exam    Weight:  155 lb Temp:  97 5 BP:  105/61 Pulse:  65 Resp:  18 O2 Sat:  96% on room air  Constitutional: Normocephalic  Orientation:Person and Place     Physical Exam   Constitutional:  Non-toxic appearance  He does not appear ill  No distress  HENT:   Head: Normocephalic  Right Ear: External ear normal    Left Ear: External ear normal    Nose: No rhinorrhea or congestion  Mouth/Throat: Mucous membranes are moist  No oropharyngeal exudate  Eyes: Pupils are equal, round, and reactive to light  No scleral icterus  Neck: Normal range of motion  Neck supple  Carotid bruit is not present  Cardiovascular: Normal rate  An irregular rhythm present  Exam reveals no gallop and no friction rub  No murmur heard  Pulmonary/Chest: Effort normal and breath sounds normal  He has no wheezes  He has no rhonchi  He has no rales  Abdominal: Soft  Normal appearance and bowel sounds are normal    Musculoskeletal:         General: No swelling  Right lower leg: No edema  Left lower leg: No edema  Comments: Able to move all 4 extremities equally  Lymphadenopathy:     He has no cervical adenopathy  Neurological: He is alert  Alert and oriented to self, place, and day  Speech clear and appropriate  Skin: Skin is warm and dry  He is not diaphoretic  Suspicious lesion left cheek  Psychiatric: His behavior is normal  Mood and thought content normal    Nursing note and vitals reviewed  Assessment/Plan:  82year-old with:    Closed fracture of sacrum with routine healing  -in setting of recent falls  -continue supportive care  -continue PT/OT  -follow-up with orthopedic     Acute low back pain without sciatica  -continues to complain of low back pain exacerbated with movement  -continue scheduled acetaminophen  -discontinue lidocaine and order salonpas pain patch  -continue p r n  decide your tolerable pain goal.  These faces help describe the pain ratings we use on a 0-10 scale.   Be prepared to tell us your goal and whether or not you take pain or anxiety medications at home.      BEFORE YOU COME TO THE HOSPITAL  (Pre-op instructions)  • Do not eat, drink, smoke or chew gum after midnight the night before surgery.  This also includes no mints.  • Morning of surgery take only the medicines you have been instructed with a sip of water unless otherwise instructed  by your physician.  • Do not shave, wear makeup or dark nail polish.  • Remove all jewelry including rings.  • Leave anything you consider valuable at home.  • Leave your suitcase in the car until after your surgery.  • Bring the following with you if applicable:  o Picture ID and insurance, Medicare or Medicaid cards  o Co-pay/deductible required by insurance (cash, check, credit card)  o Copy of advance directive, living will or power-of- documents if not brought to PAT  o CPAP or BIPAP mask and tubing  o Relaxation aids ( book, magazine), etc.  o Hearing aids                                 ON THE DAY OF SURGERY  · On the day of surgery check in at registration located at the main entrance of the hospital.   ? You will be registered and given a beeper with instructions where to wait in the main lobby.  ? When your beeper lights up and vibrates a member of the Outpatient Surgery staff will meet you at the double doors under the stair steps and escort you to your preoperative room.   · You may have cloth compression devices placed on your legs. These help to prevent blood clots and reduce swelling in your legs.  · An IV may be inserted into one of your veins.  · In the operating room, you may be given one or more of the following:  ? A medicine to help you relax (sedative).  ? A medicine to numb the area (local anesthetic).  ? A medicine to make you fall asleep (general anesthetic).  ? A medicine that is injected into an  "area of your body to numb everything below the injection site (regional anesthetic).  · Your surgical site will be marked or identified.  · You may be given an antibiotic through your IV to help prevent infection.  Contact a health care provider if you:  · Develop a fever of more than 100.4°F (38°C) or other feelings of illness during the 48 hours before your surgery.  · Have symptoms that get worse.  Have questions or concerns about your surgery    General Anesthesia/Surgery, Adult  General anesthesia is the use of medicines to make a person \"go to sleep\" (unconscious) for a medical procedure. General anesthesia must be used for certain procedures, and is often recommended for procedures that:  · Last a long time.  · Require you to be still or in an unusual position.  · Are major and can cause blood loss.  The medicines used for general anesthesia are called general anesthetics. As well as making you unconscious for a certain amount of time, these medicines:  · Prevent pain.  · Control your blood pressure.  · Relax your muscles.  Tell a health care provider about:  · Any allergies you have.  · All medicines you are taking, including vitamins, herbs, eye drops, creams, and over-the-counter medicines.  · Any problems you or family members have had with anesthetic medicines.  · Types of anesthetics you have had in the past.  · Any blood disorders you have.  · Any surgeries you have had.  · Any medical conditions you have.  · Any recent upper respiratory, chest, or ear infections.  · Any history of:  ? Heart or lung conditions, such as heart failure, sleep apnea, asthma, or chronic obstructive pulmonary disease (COPD).  ?  service.  ? Depression or anxiety.  · Any tobacco or drug use, including marijuana or alcohol use.  · Whether you are pregnant or may be pregnant.  What are the risks?  Generally, this is a safe procedure. However, problems may occur, including:  · Allergic reaction.  · Lung and heart " Oxycodone IR    Hyponatremia  -sodium trend trend 132 on a 04/2020<131<128  Fluid restriction not necessary this time  -recheck BMP in 1 week  -continue to monitor    Anemia  -CBC on 8/4 reviewed  Hemoglobin 13 1<<11 5    Hypertension  -SBPs trending 100s-120s  -patient is on metoprolol succinate ER 12 5 mg daily  -continue to monitor    Pemphigus  -stable  -continue CellCept, p r n  Hydroxyzine and triamcinolone  -follow-up with dermatology    Recurrent falls  -multifactorial  -continue care and support at [facility] for ADLs  -continue PT/OT  -continue fall precautions  -continue vitamin-D supplementation  -provide nutritional support    **Please note: This follow-up note was constructed using a voice recognition system  [address] [place], [address] [facility]  7/0/06150:36 PM problems.  · Inhaling food or liquid from the stomach into the lungs (aspiration).  · Nerve injury.  · Air in the bloodstream, which can lead to stroke.  · Extreme agitation or confusion (delirium) when you wake up from the anesthetic.  · Waking up during your procedure and being unable to move. This is rare.  These problems are more likely to develop if you are having a major surgery or if you have an advanced or serious medical condition. You can prevent some of these complications by answering all of your health care provider's questions thoroughly and by following all instructions before your procedure.  General anesthesia can cause side effects, including:  · Nausea or vomiting.  · A sore throat from the breathing tube.  · Hoarseness.  · Wheezing or coughing.  · Shaking chills.  · Tiredness.  · Body aches.  · Anxiety.  · Sleepiness or drowsiness.  · Confusion or agitation.  RISKS AND COMPLICATIONS OF SURGERY  Your health care provider will discuss possible risks and complications with you before surgery. Common risks and complications include:    · Problems due to the use of anesthetics.  · Blood loss and replacement (does not apply to minor surgical procedures).  · Temporary increase in pain due to surgery.  · Uncorrected pain or problems that the surgery was meant to correct.  · Infection.  · New damage.    What happens before the procedure?    Medicines  Ask your health care provider about:  · Changing or stopping your regular medicines. This is especially important if you are taking diabetes medicines or blood thinners.  · Taking medicines such as aspirin and ibuprofen. These medicines can thin your blood. Do not take these medicines unless your health care provider tells you to take them.  · Taking over-the-counter medicines, vitamins, herbs, and supplements. Do not take these during the week before your procedure unless your health care provider approves them.  General instructions  · Starting 3-6 weeks  before the procedure, do not use any products that contain nicotine or tobacco, such as cigarettes and e-cigarettes. If you need help quitting, ask your health care provider.  · If you brush your teeth on the morning of the procedure, make sure to spit out all of the toothpaste.  · Tell your health care provider if you become ill or develop a cold, cough, or fever.  · If instructed by your health care provider, bring your sleep apnea device with you on the day of your surgery (if applicable).  · Ask your health care provider if you will be going home the same day, the following day, or after a longer hospital stay.  ? Plan to have someone take you home from the hospital or clinic.  ? Plan to have a responsible adult care for you for at least 24 hours after you leave the hospital or clinic. This is important.  What happens during the procedure?  · You will be given anesthetics through both of the following:  ? A mask placed over your nose and mouth.  ? An IV in one of your veins.  · You may receive a medicine to help you relax (sedative).  · After you are unconscious, a breathing tube may be inserted down your throat to help you breathe. This will be removed before you wake up.  · An anesthesia specialist will stay with you throughout your procedure. He or she will:  ? Keep you comfortable and safe by continuing to give you medicines and adjusting the amount of medicine that you get.  ? Monitor your blood pressure, pulse, and oxygen levels to make sure that the anesthetics do not cause any problems.  The procedure may vary among health care providers and hospitals.  What happens after the procedure?  · Your blood pressure, temperature, heart rate, breathing rate, and blood oxygen level will be monitored until the medicines you were given have worn off.  · You will wake up in a recovery area. You may wake up slowly.  · If you feel anxious or agitated, you may be given medicine to help you calm down.  · If you will be  going home the same day, your health care provider may check to make sure you can walk, drink, and urinate.  · Your health care provider will treat any pain or side effects you have before you go home.  · Do not drive for 24 hours if you were given a sedative.  Summary  · General anesthesia is used to keep you still and prevent pain during a procedure.  · It is important to tell your healthcare provider about your medical history and any surgeries you have had, and previous experience with anesthesia.  · Follow your healthcare provider’s instructions about when to stop eating, drinking, or taking certain medicines before your procedure.  · Plan to have someone take you home from the hospital or clinic.  This information is not intended to replace advice given to you by your health care provider. Make sure you discuss any questions you have with your health care provider.  Document Released: 03/26/2009 Document Revised: 08/03/2018 Document Reviewed: 08/03/2018  [employer] Interactive Patient Education © 2019 [employer]      Fall Prevention in Hospitals, Adult  As a hospital patient, your condition and the treatments you receive can increase your risk for falls. Some additional risk factors for falls in a hospital include:  · Being in an unfamiliar environment.  · Being on bed rest.  · Your surgery.  · Taking certain medicines.  · Your tubing requirements, such as intravenous (IV) therapy or catheters.  It is important that you learn how to decrease fall risks while at the hospital. Below are important tips that can help prevent falls.  SAFETY TIPS FOR PREVENTING FALLS  Talk about your risk of falling.  · Ask your health care provider why you are at risk for falling. Is it your medicine, illness, tubing placement, or something else?  · Make a plan with your health care provider to keep you safe from falls.  · Ask your health care provider or pharmacist about side effects of your medicines. Some medicines can make you  dizzy or affect your coordination.  Ask for help.  · Ask for help before getting out of bed. You may need to press your call button.  · Ask for assistance in getting safely to the toilet.  · Ask for a walker or cane to be put at your bedside. Ask that most of the side rails on your bed be placed up before your health care provider leaves the room.  · Ask family or friends to sit with you.  · Ask for things that are out of your reach, such as your glasses, hearing aids, telephone, bedside table, or call button.  Follow these tips to avoid falling:  · Stay lying or seated, rather than standing, while waiting for help.  · Wear rubber-soled slippers or shoes whenever you walk in the hospital.  · Avoid quick, sudden movements.  ¨ Change positions slowly.  ¨ Sit on the side of your bed before standing.  ¨ Stand up slowly and wait before you start to walk.  · Let your health care provider know if there is a spill on the floor.  · Pay careful attention to the medical equipment, electrical cords, and tubes around you.  · When you need help, use your call button by your bed or in the bathroom. Wait for one of your health care providers to help you.  · If you feel dizzy or unsure of your footing, return to bed and wait for assistance.  · Avoid being distracted by the TV, telephone, or another person in your room.  · Do not lean or support yourself on rolling objects, such as IV poles or bedside tables.     This information is not intended to replace advice given to you by your health care provider. Make sure you discuss any questions you have with your health care provider.     Document Released: 12/15/2001 Document Revised: 01/08/2016 Document Reviewed: 08/25/2013  [employer] Interactive Patient Education ©2016 [employer]       Surgical Site Infections FAQs  What is a Surgical Site Infection (SSI)?  A surgical site infection is an infection that occurs after surgery in the part of the body where the surgery took place. Most  patients who have surgery do not develop an infection. However, infections develop in about 1 to 3 out of every 100 patients who have surgery.  Some of the common symptoms of a surgical site infection are:  · Redness and pain around the area where you had surgery  · Drainage of cloudy fluid from your surgical wound  · Fever  Can SSIs be treated?  Yes. Most surgical site infections can be treated with antibiotics. The antibiotic given to you depends on the bacteria (germs) causing the infection. Sometimes patients with SSIs also need another surgery to treat the infection.  What are some of the things that hospitals are doing to prevent SSIs?  To prevent SSIs, doctors, nurses, and other healthcare providers:  · Clean their hands and arms up to their elbows with an antiseptic agent just before the surgery.  · Clean their hands with soap and water or an alcohol-based hand rub before and after caring for each patient.  · May remove some of your hair immediately before your surgery using electric clippers if the hair is in the same area where the procedure will occur. They should not shave you with a razor.  · Wear special hair covers, masks, gowns, and gloves during surgery to keep the surgery area clean.  · Give you antibiotics before your surgery starts. In most cases, you should get antibiotics within 60 minutes before the surgery starts and the antibiotics should be stopped within 24 hours after surgery.  · Clean the skin at the site of your surgery with a special soap that kills germs.  What can I do to help prevent SSIs?  Before your surgery:  · Tell your doctor about other medical problems you may have. Health problems such as allergies, diabetes, and obesity could affect your surgery and your treatment.  · Quit smoking. Patients who smoke get more infections. Talk to your doctor about how you can quit before your surgery.  · Do not shave near where you will have surgery. Shaving with a razor can irritate your  skin and make it easier to develop an infection.  At the time of your surgery:  · Speak up if someone tries to shave you with a razor before surgery. Ask why you need to be shaved and talk with your surgeon if you have any concerns.  · Ask if you will get antibiotics before surgery.  After your surgery:  · Make sure that your healthcare providers clean their hands before examining you, either with soap and water or an alcohol-based hand rub.  · If you do not see your providers clean their hands, please ask them to do so.  · Family and friends who visit you should not touch the surgical wound or dressings.  · Family and friends should clean their hands with soap and water or an alcohol-based hand rub before and after visiting you. If you do not see them clean their hands, ask them to clean their hands.  What do I need to do when I go home from the hospital?  · Before you go home, your doctor or nurse should explain everything you need to know about taking care of your wound. Make sure you understand how to care for your wound before you leave the hospital.  · Always clean your hands before and after caring for your wound.  · Before you go home, make sure you know who to contact if you have questions or problems after you get home.  · If you have any symptoms of an infection, such as redness and pain at the surgery site, drainage, or fever, call your doctor immediately.  If you have additional questions, please ask your doctor or nurse.  Developed and co-sponsored by The Society for Healthcare Epidemiology of [place] (SHEA); Infectious Diseases Society of [place] (IDSA); American Hospital Association; Association for Professionals in Infection Control and Epidemiology (APIC); Centers for Disease Control and Prevention (CDC); and The Joint Commission.     This information is not intended to replace advice given to you by your health care provider. Make sure you discuss any questions you have with your health care  provider.     Document Released: 12/23/2014 Document Revised: 01/08/2016 Document Reviewed: 03/02/2016  [employer] Interactive Patient Education ©2016 [employer]     PATIENT/FAMILY/RESPONSIBLE PARTY VERBALIZES UNDERSTANDING OF ABOVE EDUCATION.  COPY OF PAIN SCALE GIVEN AND REVIEWED WITH VERBALIZED UNDERSTANDING.

## 2020-08-10 ENCOUNTER — NURSING HOME VISIT (OUTPATIENT)
Dept: GERIATRICS | Facility: OTHER | Age: 85
End: 2020-08-10
Payer: COMMERCIAL

## 2020-08-10 DIAGNOSIS — S32.10XD CLOSED FRACTURE OF SACRUM WITH ROUTINE HEALING, UNSPECIFIED PORTION OF SACRUM, SUBSEQUENT ENCOUNTER: ICD-10-CM

## 2020-08-10 DIAGNOSIS — E87.1 HYPONATREMIA: ICD-10-CM

## 2020-08-10 DIAGNOSIS — I10 ESSENTIAL HYPERTENSION: ICD-10-CM

## 2020-08-10 DIAGNOSIS — M54.50 ACUTE LOW BACK PAIN WITHOUT SCIATICA, UNSPECIFIED BACK PAIN LATERALITY: Primary | ICD-10-CM

## 2020-08-10 DIAGNOSIS — R29.6 RECURRENT FALLS: ICD-10-CM

## 2020-08-10 LAB — EXT SARS-COV-2: NOT DETECTED

## 2020-08-10 PROCEDURE — 99309 SBSQ NF CARE MODERATE MDM 30: CPT | Performed by: NURSE PRACTITIONER

## 2020-08-10 NOTE — ASSESSMENT & PLAN NOTE
-in setting of recent falls  -no recent falls reported at current rehab facility  -continue care and support at [facility] for ADLs  -continue PT/OT  -follow-up with orthopedic

## 2020-08-10 NOTE — ASSESSMENT & PLAN NOTE
-continues with low back pain exacerbated with activity  -pain medication is effective per patient which brings pain level from 8-9/10 down to 2-3/10  -continue salonpas pain patch  -continue scheduled acetaminophen  -continue p r n   Oxycodone IR

## 2020-08-10 NOTE — PROGRESS NOTES
[facility]   [address] [facility], [facility] [place] [address]  POS: 31 (STR)  STR Note    History obtained from patient, nursing staff and EMR  Chief Complaint/Reason for visit: STR follow up of closed fracture of sacrum with routine healing; acute low back pain without sciatica; hyponatremia; hypertension; recurrent falls  History of Present Illness: HPI  Past Medical History: reviewed and updated  Past Medical History:   Diagnosis Date    Hypertension      Family History: unchanged from history and physical  Social History: unchanged from history and physical  Resident Since:  07/31/2020  Review of systems: Review of Systems   Constitutional: Negative for appetite change, chills, diaphoresis and fatigue  HENT: Negative  Respiratory: Negative for cough and shortness of breath  Cardiovascular: Negative for chest pain and palpitations  Gastrointestinal: Negative for abdominal pain  Genitourinary: Negative  Musculoskeletal: Positive for back pain and gait problem  Neurological: Negative for dizziness, syncope, speech difficulty, light-headedness and headaches  Psychiatric/Behavioral: Negative for agitation, behavioral problems, decreased concentration, dysphoric mood, hallucinations and sleep disturbance  The patient is not nervous/anxious  All other systems reviewed and are negative  Medications: All medication orders were reviewed at facility EMR  No changes made  Allergies: Reviewed and unchanged  Consults reviewed:PT, OT and Other  Labs/Diagnostics (reviewed by this provider): Copy in Chart    Imaging Reviewed:  None today    Physical Exam    Weight:  155 lb Temp:  98 2 BP:  117/62     Pulse:  69 Resp:  18 O2 Sat:  96% on room air  Constitutional: Normocephalic  Orientation:Person and Place     Physical Exam  Vitals signs and nursing note reviewed  Constitutional:       General: He is not in acute distress  Appearance: Normal appearance   He is not ill-appearing, toxic-appearing or diaphoretic  HENT:      Head: Normocephalic  Nose: No congestion  Mouth/Throat:      Mouth: Mucous membranes are moist       Pharynx: No oropharyngeal exudate  Eyes:      Extraocular Movements: Extraocular movements intact  Conjunctiva/sclera: Conjunctivae normal       Pupils: Pupils are equal, round, and reactive to light  Cardiovascular:      Rate and Rhythm: Normal rate  Heart sounds: No murmur  No friction rub  No gallop  Pulmonary:      Effort: Pulmonary effort is normal  No respiratory distress  Breath sounds: Normal breath sounds  No wheezing, rhonchi or rales  Abdominal:      General: Bowel sounds are normal  There is no distension  Palpations: Abdomen is soft  Tenderness: There is no abdominal tenderness  There is no guarding  Musculoskeletal:      Right lower leg: No edema  Left lower leg: No edema  Comments: Able to move all 4 extremities  Skin:     General: Skin is warm and dry  Comments: Violaceous lesion left cheek   Neurological:      Mental Status: He is alert  Mental status is at baseline  Comments: Speech clear and appropriate  Psychiatric:         Mood and Affect: Mood normal          Behavior: Behavior normal          Thought Content: Thought content normal          Judgment: Judgment normal        Assessment/Plan:  73-year-old male with:    Closed fracture of sacrum with routine healing  -in setting of recent falls  -no recent falls reported at current rehab facility  -continue care and support at [employer] for ADLs  -continue PT/OT  -follow-up with orthopedic    Acute low back pain without sciatica  -continues with low back pain exacerbated with activity  -pain medication is effective per patient which brings pain level from 8-9/10 down to 2-3/10  -continue salonpas pain patch  -continue scheduled acetaminophen  -continue p r n   Oxycodone IR    Hyponatremia  -sodium level up trending  -check BMP on 8/11    Hypertension  -BPs stable  -continue metoprolol succinate ER 12 5 mg daily    Recurrent falls  -continue fall precautions  -continue PT/OT  -continue vitamin-D supplementation    **Please note: This follow-up note was constructed using a voice recognition system  [address] [place] [facility]  4/59/42622:34 PM

## 2020-08-12 ENCOUNTER — NURSING HOME VISIT (OUTPATIENT)
Dept: GERIATRICS | Facility: OTHER | Age: 85
End: 2020-08-12
Payer: COMMERCIAL

## 2020-08-12 DIAGNOSIS — R13.14 DYSPHAGIA, PHARYNGOESOPHAGEAL: ICD-10-CM

## 2020-08-12 DIAGNOSIS — D64.9 ANEMIA, UNSPECIFIED TYPE: ICD-10-CM

## 2020-08-12 DIAGNOSIS — M54.50 ACUTE LOW BACK PAIN WITHOUT SCIATICA, UNSPECIFIED BACK PAIN LATERALITY: Primary | ICD-10-CM

## 2020-08-12 DIAGNOSIS — R29.6 RECURRENT FALLS: ICD-10-CM

## 2020-08-12 DIAGNOSIS — I10 ESSENTIAL HYPERTENSION: ICD-10-CM

## 2020-08-12 DIAGNOSIS — E87.1 HYPONATREMIA: ICD-10-CM

## 2020-08-12 PROBLEM — J31.0 CHRONIC RHINITIS: Status: ACTIVE | Noted: 2020-08-12

## 2020-08-12 PROCEDURE — 99309 SBSQ NF CARE MODERATE MDM 30: CPT | Performed by: NURSE PRACTITIONER

## 2020-08-12 NOTE — PROGRESS NOTES
[facility]DR[facility]   [address] [place] [address], [place], [address]  POS: 31 (STR)  STR Notes    Chief Complaint/Reason for visit: STR follow up of closed fracture of sacrum with routine healing; acute low back pain without sciatica; hyponatremia; anemia; hypertension; pemphigus; recurrent falls; pharyngoesophageal dysphagia  History of Present Illness: 72-year-old male seen and examined for STR follow up  Patient was OOB performing exercises with therapist   He actively participates in therapy and is making steady gains  Continues with pain in his lower back and states pain level is always  4/10 and 8/10 when he requests oxycodone  Appetite is good and sleeping well  Denies shortness of breath, chest pain, headache, dizziness, abdominal pain, nausea, vomiting, diarrhea, or constipation  Past Medical History: unchanged from history and physical  Past Medical History:   Diagnosis Date    Hypertension      Family History: unchanged from history and physical  Social History: unchanged from history and physical  Resident Since:  07/31/2020  Review of systems: Review of Systems   Constitutional: Negative for appetite change, chills, diaphoresis and fatigue  HENT: Positive for postnasal drip and rhinorrhea  Eyes: Negative  Respiratory: Negative for cough and shortness of breath  Cardiovascular: Negative for chest pain and palpitations  Gastrointestinal: Negative for abdominal pain, constipation and diarrhea  Genitourinary: Negative  Musculoskeletal: Positive for back pain and gait problem  Neurological: Negative for dizziness, syncope, light-headedness and headaches  Psychiatric/Behavioral: Negative for behavioral problems, confusion, dysphoric mood, hallucinations and sleep disturbance  The patient is not nervous/anxious  All other systems reviewed and are negative  Medications: All medication orders were reviewed at facility    No changes made  Allergies: Reviewed and unchanged penicillin, sulfa  Consults reviewed:PT, OT and Other  Labs/Diagnostics (reviewed by this provider): Copy in Chart    Imaging Reviewed:  None today    Physical Exam    Weight:  155 lb Temp:  97 5 BP:  111/66 Pulse:  64 Resp:16 O2 Sat:  98% on room air  Constitutional: Normocephalic  Orientation:Person, Place and Day     Physical Exam  Vitals signs and nursing note reviewed  Constitutional:       General: He is not in acute distress  Appearance: Normal appearance  He is not ill-appearing, toxic-appearing or diaphoretic  HENT:      Head: Normocephalic and atraumatic  Right Ear: External ear normal       Left Ear: External ear normal       Nose: Rhinorrhea present  Mouth/Throat:      Mouth: Mucous membranes are moist       Pharynx: No oropharyngeal exudate  Eyes:      General: No scleral icterus  Extraocular Movements: Extraocular movements intact  Conjunctiva/sclera: Conjunctivae normal       Pupils: Pupils are equal, round, and reactive to light  Neck:      Musculoskeletal: Neck supple  Cardiovascular:      Rate and Rhythm: Normal rate  Rhythm irregular  Heart sounds: No murmur  No friction rub  No gallop  Abdominal:      General: Bowel sounds are normal       Palpations: Abdomen is soft  Musculoskeletal:         General: No swelling  Comments: Able to move all 4 extremities  Lymphadenopathy:      Cervical: No cervical adenopathy  Skin:     General: Skin is warm and dry  Neurological:      Mental Status: He is alert  Mental status is at baseline  Comments: Alert and oriented to self, place, and day  Speech clear and appropriate  Psychiatric:         Mood and Affect: Mood normal          Behavior: Behavior normal          Thought Content:  Thought content normal          Judgment: Judgment normal        Assessment/Plan:  19-year-old male with:    Acute low back pain without sciatica  -pain exacerbates with activity  -continue salonpas pain patch  -continue scheduled acetaminophen, p r n  Oxycodone IR  -continue to monitor    Hyponatremia  -improving  -sodium trend 133<132<131<128  -euvolemic on exam  -ensure adequate hydration    Anemia  -CBC stable on 8/4/2020 with platelet count slightly elevated 388    Hypertension  -SBPs trending 90s-120  -consider discontinuing metoprolol if SBP continues <=120    Pemphigus  -stable  -continue CellCept, p r n  Hydroxyzine and triamcinolone  -routine outpatient follow-up with dermatology    Recurrent falls  -continue supportive care at STR  -no recent falls at facility  -continue PT/OT  -continue fall precautions    Dysphagia, pharyngoesophageal  -mild suspected  -aspiration precautions  -speech therapy    Chronic rhinitis  -order Claritin 10 mg daily      **Please note: This follow-up note was constructed using a voice recognition system  [address] [place] [facility]  7/76/[id number]:33 AM

## 2020-08-12 NOTE — ASSESSMENT & PLAN NOTE
-pain exacerbates with activity  -continue salonpas pain patch  -continue scheduled acetaminophen, p r n   Oxycodone IR  -continue to monitor

## 2020-08-12 NOTE — ASSESSMENT & PLAN NOTE
-stable  -continue CellCept, p r n   Hydroxyzine and triamcinolone  -routine outpatient follow-up with dermatology

## 2020-08-12 NOTE — ASSESSMENT & PLAN NOTE
-continue supportive care at STR  -no recent falls at facility  -continue PT/OT  -continue fall precautions

## 2020-08-13 LAB — EXT SARS-COV-2: NOT DETECTED

## 2020-08-18 ENCOUNTER — NURSING HOME VISIT (OUTPATIENT)
Dept: GERIATRICS | Facility: OTHER | Age: 85
End: 2020-08-18
Payer: COMMERCIAL

## 2020-08-18 ENCOUNTER — TELEPHONE (OUTPATIENT)
Dept: GERIATRICS | Facility: OTHER | Age: 85
End: 2020-08-18

## 2020-08-18 DIAGNOSIS — L10.9 PEMPHIGUS: ICD-10-CM

## 2020-08-18 DIAGNOSIS — E87.1 HYPONATREMIA: ICD-10-CM

## 2020-08-18 DIAGNOSIS — D64.9 ANEMIA, UNSPECIFIED TYPE: ICD-10-CM

## 2020-08-18 DIAGNOSIS — S32.10XD CLOSED FRACTURE OF SACRUM WITH ROUTINE HEALING, UNSPECIFIED PORTION OF SACRUM, SUBSEQUENT ENCOUNTER: Primary | ICD-10-CM

## 2020-08-18 DIAGNOSIS — J30.1 ALLERGIC RHINITIS DUE TO POLLEN, UNSPECIFIED SEASONALITY: ICD-10-CM

## 2020-08-18 DIAGNOSIS — I10 ESSENTIAL HYPERTENSION: ICD-10-CM

## 2020-08-18 DIAGNOSIS — R53.81 DEBILITY: ICD-10-CM

## 2020-08-18 DIAGNOSIS — R29.6 RECURRENT FALLS: ICD-10-CM

## 2020-08-18 DIAGNOSIS — M54.50 ACUTE LOW BACK PAIN WITHOUT SCIATICA, UNSPECIFIED BACK PAIN LATERALITY: ICD-10-CM

## 2020-08-18 PROCEDURE — 99316 NF DSCHRG MGMT 30 MIN+: CPT | Performed by: NURSE PRACTITIONER

## 2020-08-18 NOTE — TELEPHONE ENCOUNTER
I called patient's son Vj Maki today and left him a voice message to call me so that I may review lab results (abnormal sodium level from today) with him and discuss treatment plan  He called me at approximately 1800  He made arrangements for his father to be discharged home tomorrow  I provided an update on his father's condition, reviewed BMP results, and treatment plan  BMP WNL except for sodium level which was 125  Sodium level had been uptrending from hospital until today  I informed him that I ordered sodium chloride tablets, fluid restriction, and stat BMP in the am  He agreed with my treatment plan and stated "he always had a low sodium problem"  I called [facility] and provided charge nurse Keke Stephens with an update and to inform Dr Della Farmer in am of lab results

## 2020-08-18 NOTE — PROGRESS NOTES
[facility]  [address]  [phone]  DISCHARGE SUMMARY  POS: 31 (MGM)  Facility: [facility]ILDR[facility]    NAME: Raul Rao  AGE: 82 y o  SEX: male  DATE OF ADMISSION:  07/31/2020 DATE OF DISCHARGE:  08/19/2020 DISCHARGE DISPOSITION:  Home  Reason for admission: Patient was admitted from [place] [id number] for rehabilitation after hospitalization for progressive lower back pain since fall and found to have sacral fracture (S2) per imaging    Admission Diagnoses:  Sacral fracture, recurrent falls  Additional Problems:   Past Medical History:   Diagnosis Date    Hypertension      Discharge Diagnoses:  See problem list follow-up recommendations below  Course of stay: Patient was admitted to [facility] for rehabilitation following hospitalization at [facility] for above mentioned  During the resident's stay at [facility], he received skilled nursing care, PT, OT, speech therapy, nutritional support, social service support, and medical management for an overall improvement in his functional status  He is scheduled to be discharged home on 08/19/2020  A referral was placed to [facility] by social service  Labs and testing performed during stay:  Copy of lab results will be sent to PCP at closing of this note  Covid-19 negative on 8/3, 08/10 and 8/13  BMP, CBC with diff  Hemoglobin:  13 1     hematocrit:  39 5    WBC:  5 2    platelet count:  885  Creatinine:  0 92      BUN:  14      GFR:  77  Discharge Medications: See discharge medication list which was reviewed      Status at time of discharge exam: Stable    Today's Visit: 8/18/202011:35 AM    Subjective: "Left low back pain"  Constitutional:  Negative for appetite change, chills, diaphoresis and fatigue  HEENT:  Positive for postnasal drip  Eyes:  Negative  Respiratory:  Negative for cough and shortness of breath  Cardiovascular:  Negative for chest pain and palpitations  Gastrointestinal: Negative for abdominal pain  Musculoskeletal:  Positive for back pain and gait problem  Neurological:  Negative for dizziness, syncope, lightheadedness and headaches    Vitals: Weight: 155 lb   BP: 126/76   Temp: 97 8   HR: 78  Resp: 16     Exam: Physical Exam  Vitals signs and nursing note reviewed  Constitutional:       General: He is not in acute distress  Appearance: He is not ill-appearing, toxic-appearing or diaphoretic  HENT:      Head: Normocephalic  Nose: No congestion or rhinorrhea  Mouth/Throat:      Mouth: Mucous membranes are moist       Pharynx: No oropharyngeal exudate  Eyes:      General:         Right eye: No discharge  Left eye: No discharge  Neck:      Musculoskeletal: Neck supple  Cardiovascular:      Rate and Rhythm: Normal rate and regular rhythm  Pulmonary:      Effort: Pulmonary effort is normal  No respiratory distress  Breath sounds: Normal breath sounds  No wheezing or rales  Abdominal:      General: Bowel sounds are normal       Palpations: Abdomen is soft  Musculoskeletal:      Right lower leg: No edema  Left lower leg: No edema  Comments: Able to move all 4 extremities  Lymphadenopathy:      Cervical: No cervical adenopathy  Skin:     General: Skin is warm and dry  Comments: Lesion left cheek  Neurological:      Mental Status: He is alert  Mental status is at baseline  Comments: Has cognitive and memory impairment    Forgetful of present events   Psychiatric:         Mood and Affect: Mood normal          Behavior: Behavior normal        Discussion with patient/family and further instructions:  -Fall precautions  -Aspiration precautions  -Bleeding precautions  -Monitor for signs/symptoms of infection  -Medication list was reviewed and signed  -DME form was completed    Follow-up Recommendations: Please follow-up with your primary care physician within 7-10 days of discharge to review medication changes and current status  Problem List Follow-up Recommendations:  51-year-old male with:    Closed fracture of sacrum with routine healing  -in setting of recent falls  -no recent falls reported at current SNF  -continue fall precautions  -continue scheduled acetaminophen, salonpas pain patch, and p r n  Oxycodone for severe pain  -follow-up with orthopedic    Acute low back pain without sciatica  -continues with low back pain which exacerbates with activity, relieved with rest and pain medication  -continue pain medications as above  -continue PT/OT at home    Hyponatremia  -sodium level up trending  -sodium 133<132<131<128  -awaiting BMP results from today  -ensure adequate hydration    Anemia  -CBC with diff stable on 08/04/2020 with platelet count slightly elevated at 388  -follow up with PCP for management    Hypertension  -BPs stable  -continue metoprolol succinate ER 12 5 mg daily  -follow up with PCP for management    Pemphigus  -stable  -continue CellCept, p r n  Hydroxyzine and triamcinolone  -follow-up with dermatology    Allergic rhinitis due to pollen  -continue Claritin 10 mg daily    Debility/recurrent falls  -in setting of recurrent falls and sacral fracture  -continue PT/OT  -continue fall precautions    I have spent >30minutes with patient today in which greater than 50% of this time was spent in counseling/coordination of care regarding Intructions for management, Importance of tx compliance and Risk factor reductions  PCP made aware of above discharge via epic communication a closing of note    **Please note: This discharge summary was constructed using a voice recognition system  [address] [place] [place]   1/01/[id number]:36 AM  System

## 2020-08-18 NOTE — ASSESSMENT & PLAN NOTE
-CBC with diff stable on 08/04/2020 with platelet count slightly elevated at 388  -follow up with PCP for management

## 2020-08-18 NOTE — ASSESSMENT & PLAN NOTE
-in setting of recent falls  -no recent falls reported at current SNF  -continue fall precautions  -continue scheduled acetaminophen, salonpas pain patch, and p r n   Oxycodone for severe pain  -follow-up with orthopedic

## 2020-08-18 NOTE — ASSESSMENT & PLAN NOTE
-sodium level up trending  -sodium 133<132<131<128  -awaiting BMP results from today  -ensure adequate hydration

## 2020-08-18 NOTE — ASSESSMENT & PLAN NOTE
-continues with low back pain which exacerbates with activity, relieved with rest and pain medication  -continue pain medications as above  -continue PT/OT at home